# Patient Record
Sex: MALE | Race: WHITE | URBAN - METROPOLITAN AREA
[De-identification: names, ages, dates, MRNs, and addresses within clinical notes are randomized per-mention and may not be internally consistent; named-entity substitution may affect disease eponyms.]

---

## 2018-02-21 NOTE — TELEPHONE ENCOUNTER
APPT INFO    Date /Time: 2/23/18 8:30AM    Reason for Appt: R chronic mastoiditis //  NO OTHER OUTSIDE RECORDS   Ref Provider/Clinic: Hussein MCGUIRE, Reji COWART   Are there internal records? Yes/No?  IF YES, list clinic names: No internal recs    Are there outside records? Yes/No? Yes    Patient Contact (Y/N) & Call Details: No, pt was referred.    Action: Faxed cover sheet to [facility] to req. recs      OUTSIDE RECORDS CHECKLIST     CLINIC NAME COMMENTS REC (x) IMG (x)   Reji Savage MD  Recs Received & fwd to Clinic  X None

## 2018-02-22 DIAGNOSIS — H91.90 HEARING LOSS: Primary | ICD-10-CM

## 2018-02-22 NOTE — TELEPHONE ENCOUNTER
Phone Call:    Who did you talk to? (or) Who did you call? Manda called from [facility]    Call Detail/Action: Manda called and will fax recs ASAP

## 2018-02-22 NOTE — TELEPHONE ENCOUNTER
ACTION    What did you do? Faxed cover sheet to [facility] to dustin kumar ASAP - 2nd request

## 2018-02-23 ENCOUNTER — PRE VISIT (OUTPATIENT)
Dept: OTOLARYNGOLOGY | Facility: CLINIC | Age: 58
End: 2018-02-23

## 2018-02-23 NOTE — TELEPHONE ENCOUNTER
Records Received From:  [facility]     DATE/EXAM/LOCATION  (specify location if different)   Office Notes: [facility] notes: 12/12/17, 10/10/17, 3/21/17, 10/28/14   Operative Notes: - right sided myringotomy 10/24/17, 4/11/17